# Patient Record
Sex: MALE | Race: WHITE | Employment: OTHER | URBAN - NONMETROPOLITAN AREA
[De-identification: names, ages, dates, MRNs, and addresses within clinical notes are randomized per-mention and may not be internally consistent; named-entity substitution may affect disease eponyms.]

---

## 2020-09-29 ENCOUNTER — HOSPITAL ENCOUNTER (EMERGENCY)
Age: 25
Discharge: HOME | End: 2020-09-29
Attending: EMERGENCY MEDICINE
Payer: MEDICAID

## 2020-09-29 VITALS
BODY MASS INDEX: 34.99 KG/M2 | TEMPERATURE: 98.1 F | SYSTOLIC BLOOD PRESSURE: 149 MMHG | HEIGHT: 65 IN | WEIGHT: 210 LBS | HEART RATE: 71 BPM | OXYGEN SATURATION: 99 % | DIASTOLIC BLOOD PRESSURE: 83 MMHG | RESPIRATION RATE: 16 BRPM

## 2020-09-29 PROCEDURE — 6360000002 HC RX W HCPCS: Performed by: EMERGENCY MEDICINE

## 2020-09-29 PROCEDURE — 90715 TDAP VACCINE 7 YRS/> IM: CPT | Performed by: EMERGENCY MEDICINE

## 2020-09-29 PROCEDURE — 12001 RPR S/N/AX/GEN/TRNK 2.5CM/<: CPT

## 2020-09-29 PROCEDURE — 90471 IMMUNIZATION ADMIN: CPT | Performed by: EMERGENCY MEDICINE

## 2020-09-29 PROCEDURE — 99282 EMERGENCY DEPT VISIT SF MDM: CPT

## 2020-09-29 RX ADMIN — TETANUS TOXOID, REDUCED DIPHTHERIA TOXOID AND ACELLULAR PERTUSSIS VACCINE, ADSORBED 0.5 ML: 5; 2.5; 8; 8; 2.5 SUSPENSION INTRAMUSCULAR at 16:13

## 2020-09-29 ASSESSMENT — PAIN DESCRIPTION - ORIENTATION: ORIENTATION: LEFT

## 2020-09-29 ASSESSMENT — PAIN DESCRIPTION - LOCATION: LOCATION: FINGER (COMMENT WHICH ONE)

## 2020-09-29 ASSESSMENT — PAIN DESCRIPTION - FREQUENCY: FREQUENCY: CONTINUOUS

## 2020-09-29 ASSESSMENT — PAIN SCALES - GENERAL: PAINLEVEL_OUTOF10: 5

## 2020-09-29 ASSESSMENT — PAIN DESCRIPTION - PAIN TYPE: TYPE: ACUTE PAIN

## 2020-09-29 ASSESSMENT — PAIN DESCRIPTION - DESCRIPTORS: DESCRIPTORS: NAGGING

## 2020-09-29 NOTE — ED NOTES
Pt DC home in good condition with family. V/u of Dc instructions. Denies questions or concerns. Teaching done re: s/s to report.      Neha Garrett RN  09/29/20 [id number]

## 2020-09-29 NOTE — ED PROVIDER NOTES
[facility] EMERGENCY DEPARTMENT    CHIEF COMPLAINT  Laceration (laceration to ring finger tip with a razor knife while cutting drywall approx 3 hrs ago)       HISTORY OF PRESENT ILLNESS  uLisa Jo is a 30 y.o. male who presents to the ED with laceration to the distal left fourth digit. Patient was cutting drywall with a knife and lost control of the blade. Tetanus status is not up-to-date. Patient is otherwise without injury. Injury occurred several hours prior to presentation. No other complaints, modifying factors or associated symptoms. I have reviewed the following from the nursing documentation:    History reviewed. No pertinent past medical history. History reviewed. No pertinent surgical history. History reviewed. No pertinent family history.   Social History     Socioeconomic History    Marital status: Single     Spouse name: Not on file    Number of children: Not on file    Years of education: Not on file    Highest education level: Not on file   Occupational History    Not on file   Social Needs    Financial resource strain: Not on file    Food insecurity     Worry: Not on file     Inability: Not on file    Transportation needs     Medical: Not on file     Non-medical: Not on file   Tobacco Use    Smoking status: Never Smoker    Smokeless tobacco: Never Used   Substance and Sexual Activity    Alcohol use: Yes     Comment: OCC    Drug use: No    Sexual activity: Not on file   Lifestyle    Physical activity     Days per week: Not on file     Minutes per session: Not on file    Stress: Not on file   Relationships    Social connections     Talks on phone: Not on file     Gets together: Not on file     Attends Faith service: Not on file     Active member of club or organization: Not on file     Attends meetings of clubs or organizations: Not on file     Relationship status: Not on file    Intimate partner violence     Fear of current or ex partner: Not on file     Emotionally abused: Not on file     Physically abused: Not on file     Forced sexual activity: Not on file   Other Topics Concern    Not on file   Social History Narrative    Not on file     Current Facility-Administered Medications   Medication Dose Route Frequency Provider Last Rate Last Dose    Tetanus-Diphth-Acell Pertussis (BOOSTRIX) injection 0.5 mL  0.5 mL Intramuscular Once Victorina Akhtar MD         No current outpatient medications on file. No Known Allergies    REVIEW OF SYSTEMS  10 systems reviewed, pertinent positives and negatives per HPI, otherwise noted to be negative. PHYSICAL EXAM  ED TRIAGE VITALS  BP: (!) 149/83, Temp: 98.1 °F (36.7 °C), Pulse: 68, Resp: 16, SpO2: 100 %  General appearance: Awake and alert. Cooperative. No acute distress. HENT: Normocephalic. Atraumatic. Mucous membranes are moist.  Neck: Supple. Eyes: PERRL. EOMI. Heart/Chest: RRR. Lungs: Respirations unlabored. Speaking comfortably in full sentences. Musculoskeletal: Left cardinal hand functions are intact. Sensation is intact R/M/U.  2+ radial pulses. Extensor mechanism of left ring finger is intact. Skin: Warm and dry. 0.3 cm linear laceration to the distal left ring finger without nailbed involvement, hemostatic. Neurological: Alert and oriented. CN II-XII intact. Strength 5/5 bilateral upper and lower extremities. Sensation intact to light touch. Gait normal.  Psychiatric: Mood/affect: normal        ED COURSE/MDM  Patient seen and evaluated. Old records reviewed. Labs and imaging reviewed and results discussed with patient/family to extent possible. 47-year-old male presents with laceration to left ring finger. Patient is neurovascularly intact. No concern for extensor mechanism injury. No concern for nailbed laceration. Laceration cleaned and repaired as below. Tetanus status updated.   Discharged home with supportive care and expectant management and usual strict return precautions for new or dictation software. Efforts were made by me to ensure accuracy, however some errors may be present due to limitations of this technology and occasionally words are not transcribed correctly.         Harrison Mahmood MD  09/29/20 3630

## 2024-08-08 ENCOUNTER — HOSPITAL ENCOUNTER (EMERGENCY)
Age: 29
Discharge: HOME | End: 2024-08-08
Attending: EMERGENCY MEDICINE
Payer: MEDICAID

## 2024-08-08 VITALS
BODY MASS INDEX: 38.23 KG/M2 | TEMPERATURE: 98.5 F | OXYGEN SATURATION: 97 % | SYSTOLIC BLOOD PRESSURE: 137 MMHG | RESPIRATION RATE: 16 BRPM | WEIGHT: 237.9 LBS | DIASTOLIC BLOOD PRESSURE: 78 MMHG | HEART RATE: 77 BPM | HEIGHT: 66 IN

## 2024-08-08 DIAGNOSIS — B35.0 TINEA CAPITIS: Primary | ICD-10-CM

## 2024-08-08 PROCEDURE — 99283 EMERGENCY DEPT VISIT LOW MDM: CPT

## 2024-08-08 RX ORDER — CLOTRIMAZOLE 1 %
CREAM (GRAM) TOPICAL
Qty: 28 G | Refills: 1 | Status: SHIPPED | OUTPATIENT
Start: 2024-08-08 | End: 2024-08-15

## 2024-08-08 ASSESSMENT — LIFESTYLE VARIABLES
HOW OFTEN DO YOU HAVE A DRINK CONTAINING ALCOHOL: NEVER
HOW MANY STANDARD DRINKS CONTAINING ALCOHOL DO YOU HAVE ON A TYPICAL DAY: PATIENT DOES NOT DRINK

## 2024-08-08 ASSESSMENT — PAIN - FUNCTIONAL ASSESSMENT: PAIN_FUNCTIONAL_ASSESSMENT: NONE - DENIES PAIN

## 2024-08-08 NOTE — DISCHARGE INSTRUCTIONS
Selsun shampoo x 2 to 3 weeks.  Return for any worsening symptoms follow-up with your family physician

## 2024-08-08 NOTE — ED PROVIDER NOTES
CC:   Chief Complaint   Patient presents with    Skin Problem     Pt states that he thinks he has ringworm with round rash like areas on his head.        HPI:  Bharat is a 29 y.o. male who presents emergency department with complaints of an itchy scalp with a rash.  Over the past several months he has noticed an itchy scalp off and on.  Over the past couple days the patches been itching behind his left ear.  He says a family member looked at it and was concerned he had ringworm he therefore came to the emergency department.  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  He denies pain.  History obtained via the patient    External records reviewed    ROS:  All Pertinent ROS Negative Unless otherwise stated within HPI.    VITALS:  Vitals:    08/08/24 1806   BP: 137/78   Pulse: 77   Resp: 16   Temp: 98.5 °F (36.9 °C)   SpO2: 97%        PHYSICAL EXAM:      Vital signs reviewed  General:  Patient appeared in no distress  Vitals:  Vital signs reviewed, see nurses notes  Neck:  No JVD, or lymphadenopathy.  H EENT exam: He has several very subtle circular erythematous patches within the scalp posterior to the left ear.  Nontender no induration no adenopathy.    LABS/IMAGING:  Reviewed  No orders to display        Labs Reviewed - No data to display     MEDICATIONS ADMINISTERED:  Medications - No data to display  MEDICAL DECISION MAKING:    Medications Ordered  Medications - No data to display     Prescriptions Written:    New Prescriptions    CLOTRIMAZOLE (LOTRIMIN AF) 1 % CREAM    Apply topically 2 times daily.         Problems Addressed This Visit:      1.  Tinea capitis: Discharge with prescriptions for topical clotrimazole cream and instructions for Selsun shampoo x 2 to 3 weeks.  Patient at increased risk for developing tinea capitis given his work environment during a hat and a hot environment.  PCP follow-up within 1 week if no improvement.          Discussed Patient with another provider(s) and

## 2024-09-05 ENCOUNTER — HOSPITAL ENCOUNTER (EMERGENCY)
Age: 29
Discharge: HOME | End: 2024-09-05
Attending: STUDENT IN AN ORGANIZED HEALTH CARE EDUCATION/TRAINING PROGRAM
Payer: MEDICAID

## 2024-09-05 VITALS
SYSTOLIC BLOOD PRESSURE: 160 MMHG | HEART RATE: 88 BPM | WEIGHT: 241.5 LBS | BODY MASS INDEX: 40.24 KG/M2 | TEMPERATURE: 98.2 F | OXYGEN SATURATION: 100 % | DIASTOLIC BLOOD PRESSURE: 87 MMHG | HEIGHT: 65 IN | RESPIRATION RATE: 16 BRPM

## 2024-09-05 DIAGNOSIS — T14.8XXA BITE BY ANIMAL: Primary | ICD-10-CM

## 2024-09-05 PROCEDURE — 99284 EMERGENCY DEPT VISIT MOD MDM: CPT

## 2024-09-05 PROCEDURE — 90715 TDAP VACCINE 7 YRS/> IM: CPT | Performed by: STUDENT IN AN ORGANIZED HEALTH CARE EDUCATION/TRAINING PROGRAM

## 2024-09-05 PROCEDURE — 6360000002 HC RX W HCPCS: Performed by: STUDENT IN AN ORGANIZED HEALTH CARE EDUCATION/TRAINING PROGRAM

## 2024-09-05 PROCEDURE — 6370000000 HC RX 637 (ALT 250 FOR IP): Performed by: STUDENT IN AN ORGANIZED HEALTH CARE EDUCATION/TRAINING PROGRAM

## 2024-09-05 PROCEDURE — 90471 IMMUNIZATION ADMIN: CPT | Performed by: STUDENT IN AN ORGANIZED HEALTH CARE EDUCATION/TRAINING PROGRAM

## 2024-09-05 RX ADMIN — TETANUS TOXOID, REDUCED DIPHTHERIA TOXOID AND ACELLULAR PERTUSSIS VACCINE, ADSORBED 0.5 ML: 5; 2.5; 8; 8; 2.5 SUSPENSION INTRAMUSCULAR at 19:49

## 2024-09-05 RX ADMIN — AMOXICILLIN AND CLAVULANATE POTASSIUM 1 TABLET: 875; 125 TABLET, FILM COATED ORAL at 19:49

## 2024-09-05 ASSESSMENT — PAIN DESCRIPTION - PAIN TYPE: TYPE: ACUTE PAIN

## 2024-09-05 ASSESSMENT — PAIN DESCRIPTION - LOCATION: LOCATION: EAR

## 2024-09-05 ASSESSMENT — PAIN - FUNCTIONAL ASSESSMENT
PAIN_FUNCTIONAL_ASSESSMENT: 0-10
PAIN_FUNCTIONAL_ASSESSMENT: ACTIVITIES ARE NOT PREVENTED

## 2024-09-05 ASSESSMENT — PAIN DESCRIPTION - FREQUENCY: FREQUENCY: CONTINUOUS

## 2024-09-05 ASSESSMENT — PAIN DESCRIPTION - ORIENTATION: ORIENTATION: LEFT

## 2024-09-05 ASSESSMENT — PAIN DESCRIPTION - DESCRIPTORS: DESCRIPTORS: BURNING

## 2024-09-05 ASSESSMENT — PAIN SCALES - GENERAL: PAINLEVEL_OUTOF10: 2

## 2024-09-05 NOTE — DISCHARGE INSTRUCTIONS
Follow-up with primary doctor early next week for reevaluation.  Return to emergency department for any redness around the site, pus coming from the site, fevers or chills or any signs of infection.  Watch the bird bit she will and return to emergency department if there are any changes in behavior of the bird.  Take Augmentin as prescribed.  Your tetanus has been updated in the ED.

## 2024-09-06 NOTE — ED PROVIDER NOTES
Emergency Department Encounter    Patient: Bharat Albarran  MRN: [id number]  : 1995  Date of Evaluation: 2024  ED Provider:  Sudheer Koehler MD    Triage Chief Complaint:   Animal Bite    Ruby:  Bharat Albarran is a 29 y.o. male presenting with parrot bite.  Patient states he got pecked by a parrot prior to presentation.  States that bit his left ear.  States bleeding is well-controlled.  Denies significant pain in this region.  Is unsure when his last tetanus was updated.  States the parrot is owned by an acquaintance of his     ROS - see HPI, below listed is current ROS at time of my eval:  At least 14 systems reviewed, negative other than HPI    History reviewed. No pertinent past medical history.  History reviewed. No pertinent surgical history.  History reviewed. No pertinent family history.  Social History     Socioeconomic History    Marital status: Single     Spouse name: Not on file    Number of children: Not on file    Years of education: Not on file    Highest education level: Not on file   Occupational History    Not on file   Tobacco Use    Smoking status: Never    Smokeless tobacco: Never   Vaping Use    Vaping status: Never Used   Substance and Sexual Activity    Alcohol use: Yes     Comment: OCC    Drug use: No    Sexual activity: Not on file   Other Topics Concern    Not on file   Social History Narrative    Not on file     Social Determinants of Health     Financial Resource Strain: Not on file   Food Insecurity: Not on file   Transportation Needs: Not on file   Physical Activity: Not on file   Stress: Not on file   Social Connections: Not on file   Intimate Partner Violence: Not on file   Housing Stability: Not on file     No current facility-administered medications for this encounter.     Current Outpatient Medications   Medication Sig Dispense Refill    amoxicillin-clavulanate (AUGMENTIN) 875-125 MG per tablet Take 1 tablet by mouth 2 times daily for 10 days 20 tablet 0     No Known